# Patient Record
Sex: MALE | Race: WHITE | Employment: OTHER | URBAN - METROPOLITAN AREA
[De-identification: names, ages, dates, MRNs, and addresses within clinical notes are randomized per-mention and may not be internally consistent; named-entity substitution may affect disease eponyms.]

---

## 2024-01-12 ENCOUNTER — HOSPITAL ENCOUNTER (OUTPATIENT)
Dept: PHYSICAL THERAPY | Age: 84
Setting detail: THERAPIES SERIES
Discharge: HOME | End: 2024-01-12

## 2024-01-12 PROCEDURE — 97161 PT EVAL LOW COMPLEX 20 MIN: CPT | Performed by: PHYSICAL THERAPIST

## 2024-01-12 ASSESSMENT — PAIN SCALES - GENERAL: PAINLEVEL_OUTOF10: 4

## 2024-01-12 ASSESSMENT — PAIN DESCRIPTION - PAIN TYPE: TYPE: CHRONIC PAIN

## 2024-01-12 ASSESSMENT — PAIN DESCRIPTION - LOCATION: LOCATION: NECK

## 2024-01-12 ASSESSMENT — PAIN DESCRIPTION - DESCRIPTORS: DESCRIPTORS: TIGHTNESS

## 2024-01-12 NOTE — PROGRESS NOTES
Systems:  Follows Commands: Within Functional Limits  Integumentary Assessment  Edema: No    VBI Screening / Lumbar Screening:         Regional Screen:         Observations:        Palpation:   Cervical Spine Palpation: tightness noted across B cervical paraspinals/upper traps  Right Shoulder Palpation: no pain with palpation  Left Shoulder Palpation: no pain with palpation    Ambulation/Gait (if applicable):   Ambulation  Surface: Level tile  Device: No Device  Assistance: Independent    Balance Screen:   Balance  Posture: Fair (fwd head posturing, rounded shoulders)  Sitting - Static: Good  Sitting - Dynamic: Good  Standing - Static: Good  Standing - Dynamic: Good    Neuro Screen:   Sensation      Sensation  Overall Sensation Status: WFL     Left AROM  Right AROM                    Left PROM  Right PROM                    Left Strength  Right Strength         Strength LUE  L Shoulder Flexion: 4+/5  L Shoulder ABduction: 4+/5  L Shoulder Internal Rotation: 4+/5  L Shoulder External Rotation: 4+/5  L Elbow Flexion: 4+/5  L Elbow Extension: 4+/5    Strength RUE  R Shoulder Flexion: 4+/5  R Shoulder ABduction: 4+/5  R Shoulder Internal Rotation: 4+/5  R Shoulder External Rotation: 4+/5  R Elbow Flexion: 4+/5  R Elbow Extension: 4+/5     Cervical Assessment     AROM Cervical Spine   Cervical spine general AROM: limited 50% all planes  Cervical/upper back strength- grossly 4-/5              ASSESSMENT     Impression: Assessment: Patient presents to PT wtih c/o cerivcal pain/tightness. Pt did have bout of R shoulder pain howeve symptoms subsided with recent injection. AROM of cervical region remains limited 50% all planes. Limited strength noted across cervical/upper back with forward head posturing. AROM/strength of B shoulder is WFL withno onset of pain throughout session.    Body Structures, Functions, Activity Limitations Requiring Skilled Therapeutic Intervention: Decreased ROM, Decreased strength, Decreased

## 2024-01-17 ENCOUNTER — HOSPITAL ENCOUNTER (OUTPATIENT)
Dept: PHYSICAL THERAPY | Age: 84
Setting detail: THERAPIES SERIES
Discharge: HOME | End: 2024-01-17
Payer: COMMERCIAL

## 2024-01-17 PROCEDURE — 97161 PT EVAL LOW COMPLEX 20 MIN: CPT | Performed by: PHYSICAL THERAPIST

## 2024-01-17 PROCEDURE — 97110 THERAPEUTIC EXERCISES: CPT | Performed by: PHYSICAL THERAPIST

## 2024-01-17 ASSESSMENT — PAIN DESCRIPTION - ORIENTATION: ORIENTATION: LOWER

## 2024-01-17 ASSESSMENT — PAIN SCALES - GENERAL: PAINLEVEL_OUTOF10: 6

## 2024-01-17 ASSESSMENT — PAIN DESCRIPTION - LOCATION: LOCATION: BACK

## 2024-01-17 ASSESSMENT — PAIN DESCRIPTION - PAIN TYPE: TYPE: CHRONIC PAIN

## 2024-01-17 ASSESSMENT — PAIN DESCRIPTION - DESCRIPTORS: DESCRIPTORS: SPASM;ACHING

## 2024-01-17 NOTE — PROGRESS NOTES
Physical Therapy    Physical Therapy: Initial Evaluation    Patient: Laron Foster (83 y.o. male)   Examination Date: 2024  Plan of Care Certification Period: 2024 to        :  1940 ;    Confirmed: Yes MRN: [id number]  CSN: [id number]   Insurance: Payor: AETNA / Plan: AETNA / Product Type: *No Product type* /   Insurance ID: [id number] - (Commercial) Secondary Insurance (if applicable):    Referring Physician: Alex David MD     PCP: Vitaliy Fernandez MD Visits to Date/Visits Approved: 1 /      No Show/Cancelled Appts:   /       Medical Diagnosis: No admission diagnoses are documented for this encounter. low back pain  Treatment Diagnosis:       PERTINENT MEDICAL HISTORY           Medical History: Chart Reviewed: Yes   Past Medical History:   Diagnosis Date    Arthritis     Shoulders, hands (IP and MP joints)    CAD (coronary artery disease)     Carpal tunnel syndrome 2014    bilateral hands confirmed on EMG  - Dr. Ordonez    Dyslipidemia     Glaucoma     Hypertension     Inferior MI (HCC)     Lumbar disc disease     Right shoulder pain     Arthritis    Shoulder pain     bilateral     Surgical History:   Past Surgical History:   Procedure Laterality Date    APPENDECTOMY      BACK SURGERY      and 2013    BACK SURGERY      CARPAL TUNNEL RELEASE Right 10/22/2014    RJB    DIAGNOSTIC CARDIAC CATH LAB PROCEDURE  2010    Primary plasty MIMI to mid & ostial RCA by Dr. Lopez at [facility].    HEMORRHOID SURGERY      TOE SURGERY Right     REmoval of calcium deposits.    TONSILLECTOMY         Medications:   Current Outpatient Medications:     HYDROcodone-acetaminophen (NORCO) 5-325 MG per tablet, Take 1 tablet by mouth every 6 hours as needed for Pain., Disp: , Rfl:     naproxen sodium (ANAPROX) 550 MG tablet, Take  by mouth as needed., Disp: , Rfl:     aspirin 325 MG tablet, Take 81 mg by mouth daily., Disp: , Rfl:     Timolol Maleate (TIMOPTIC OP), 1 drop daily. Both eyes,

## 2024-01-17 NOTE — PROGRESS NOTES
[facility]  Phone: [phone] Fax: [phone]       Physical Therapy Daily Treatment Note  Date:  2024    Patient Name:  Laron Foster    :  1940  MRN: [id number]    Patient: Laron Foster (83 y.o. male)   Examination Date: 2024  Plan of Care Certification Period: 2024 to 24   :  1940 ;    Confirmed: Yes MRN: [id number]  CSN: [id number]   Insurance: Payor: /   Insurance ID: [id number] - (Commercial) Secondary Insurance (if applicable):    Referring Physician: Sofía Monahan APRN - NP     PCP: Vitaliy Fernandez MD Visits to Date/Visits Approved: 1 /      No Show/Cancelled Appts:   /       Medical Diagnosis: No admission diagnoses are documented for this encounter. bursitis right shoulder; cervical spondylolysis  Treatment Diagnosis:       Time In:   1045     Time Out:      1125    Subjective:  pt presents to PT for initial treatment session. Reports shoulder is feeling well however cont to have cervical symptoms     Exercises:  Exercise/Equipment Resistance/Repetitions Other comments   UBE   X6mins 3f/3b           Pulley flex              abd   X2mins  X2mins            Cervical SB    4x20s ea    Upper thoracic str  4x20s    Doorway str   4x20s            Shrugs    x20    Scap retractions      x20                                                                      Other Therapeutic Activities:      Home Exercise Program:      Manual Treatments:      Modalities:      Timed Code Treatment Minutes:      Total Treatment Minutes:      Treatment/Activity Tolerance:  [x] Patient tolerated treatment well [] Patient limited by fatigue  [] Patient limited by pain  [] Patient limited by other medical complications  [] Other: Performed there ex to increase cervical ROM/strength to reduce symptoms. Limited cervical motions remain. AROM of B shoulders is WFL. Generalized fatigue reported with exercises.     Plan:   [] Continue per plan of care [] Alter

## 2024-01-23 ENCOUNTER — HOSPITAL ENCOUNTER (OUTPATIENT)
Dept: PHYSICAL THERAPY | Age: 84
Setting detail: THERAPIES SERIES
Discharge: HOME | End: 2024-01-23
Payer: COMMERCIAL

## 2024-01-25 ENCOUNTER — HOSPITAL ENCOUNTER (OUTPATIENT)
Dept: PHYSICAL THERAPY | Age: 84
Setting detail: THERAPIES SERIES
Discharge: HOME | End: 2024-01-25
Payer: COMMERCIAL

## 2024-01-25 PROCEDURE — 97110 THERAPEUTIC EXERCISES: CPT | Performed by: PHYSICAL THERAPIST

## 2024-01-25 NOTE — PROGRESS NOTES
[facility]  Phone: [phone] Fax: [phone]       Physical Therapy Daily Treatment Note  Date:  2024    Patient Name:  Laron Foster    :  1940  MRN: [id number]    Patient: Laron Foster (83 y.o. male)   Examination Date: 2024  Plan of Care Certification Period: 2024 to 24   :  1940 ;    Confirmed: Yes MRN: [id number]  CSN: [id number]   Insurance: Payor: /   Insurance ID: [id number] - (Commercial) Secondary Insurance (if applicable):    Referring Physician: Sofía Monahan APRN - NP     PCP: Vitaliy Fernandez MD Visits to Date/Visits Approved: 4/      No Show/Cancelled Appts:   /       Medical Diagnosis: No admission diagnoses are documented for this encounter. bursitis right shoulder; cervical spondylolysis  Treatment Diagnosis:       Time In:   09  Time Out:      1000    Subjective:  pt reports that shoulders/neck are feeling better.     Exercises:  Exercise/Equipment Resistance/Repetitions Other comments   UBE   X6mins 3f/3b           Pulley flex              abd   X2mins  X2mins            Cervical SB    4x20s ea    Upper thoracic str  4x20s    Doorway str   4x20s            Shrugs    x20    Scap retractions      x20            Wand bilateral shoulder flexion/abd   2x10                                                       Other Therapeutic Activities:      Home Exercise Program:      Manual Treatments:      Modalities:  MH [id number]- back/neck region     Timed Code Treatment Minutes:      Total Treatment Minutes:      Treatment/Activity Tolerance:  [x] Patient tolerated treatment well [] Patient limited by fatigue  [] Patient limited by pain  [] Patient limited by other medical complications  [] Other: Performed there ex to increase cervical ROM/strength to reduce symptoms. Improved cervical motions noted with reduced pain.  AROM of B shoulders is WFL.  Added shoulder abd exercises with good tolerance. Patient does report discomfort with

## 2024-01-30 ENCOUNTER — HOSPITAL ENCOUNTER (OUTPATIENT)
Dept: PHYSICAL THERAPY | Age: 84
Setting detail: THERAPIES SERIES
Discharge: HOME | End: 2024-01-30
Payer: COMMERCIAL

## 2024-01-30 PROCEDURE — 97140 MANUAL THERAPY 1/> REGIONS: CPT | Performed by: PHYSICAL THERAPIST

## 2024-01-30 PROCEDURE — 97110 THERAPEUTIC EXERCISES: CPT | Performed by: PHYSICAL THERAPIST

## 2024-01-30 NOTE — PROGRESS NOTES
[facility]  Phone: [phone] Fax: [phone]       Physical Therapy Daily Treatment Note  Date:  2024    Patient Name:  Laron Foster    :  1940  MRN: [id number]    Patient: Laron Foster (83 y.o. male)   Examination Date: 2024  Plan of Care Certification Period: 2024 to 24   :  1940 ;    Confirmed: Yes MRN: [id number]  CSN: [id number]   Insurance: Payor: /   Insurance ID: [id number] - (Commercial) Secondary Insurance (if applicable):    Referring Physician: Sofía Monahan APRN - NP     PCP: Vitaliy Fernandez MD Visits to Date/Visits Approved: 5/      No Show/Cancelled Appts:   /       Medical Diagnosis: No admission diagnoses are documented for this encounter. bursitis right shoulder; cervical spondylolysis  Treatment Diagnosis:       Time In:   925  Time Out:      1010    Subjective:  pt reports that shoulders/neck are feeling better.     Exercises:  Exercise/Equipment Resistance/Repetitions Other comments   UBE   X6mins 3f/3b           Pulley flex              abd   X2mins  X2mins            Cervical SB    4x20s ea    Upper thoracic str  4x20s    Doorway str   4x20s            Shrugs    x20    Scap retractions      x20            Wand bilateral shoulder flexion/abd   2x10                                                       Other Therapeutic Activities:      Home Exercise Program:      Manual Treatments:  cervical traction/STM x 10mins     Modalities:    Timed Code Treatment Minutes:      Total Treatment Minutes:      Treatment/Activity Tolerance:  [x] Patient tolerated treatment well [] Patient limited by fatigue  [] Patient limited by pain  [] Patient limited by other medical complications  [] Other: Performed there ex to increase cervical ROM/strength to reduce symptoms. Improved cervical motions noted with reduced pain. Pt reports increased cervical motions with driving.  AROM of B shoulders is WFL.  Added cervical traction/STM for

## 2024-02-01 ENCOUNTER — HOSPITAL ENCOUNTER (OUTPATIENT)
Dept: PHYSICAL THERAPY | Age: 84
Setting detail: THERAPIES SERIES
Discharge: HOME | End: 2024-02-01
Payer: COMMERCIAL

## 2024-02-01 PROCEDURE — 97110 THERAPEUTIC EXERCISES: CPT | Performed by: PHYSICAL THERAPIST

## 2024-02-01 PROCEDURE — G0283 ELEC STIM OTHER THAN WOUND: HCPCS | Performed by: PHYSICAL THERAPIST

## 2024-02-01 NOTE — PROGRESS NOTES
[facility]  Phone: [phone] Fax: [phone]       Physical Therapy Daily Treatment Note  Date:  2024    Patient Name:  Laron Foster    :  1940  MRN: [id number]    Patient: Laron Foster (83 y.o. male)   Examination Date: 2024  Plan of Care Certification Period: 2024 to 24   :  1940 ;    Confirmed: Yes MRN: [id number]  CSN: [id number]   Insurance: Payor: /   Insurance ID: [id number] - (Commercial) Secondary Insurance (if applicable):    Referring Physician: Sofía Monahan APRN - NP     PCP: Vitaliy Fernandez MD Visits to Date/Visits Approved: 6/      No Show/Cancelled Appts:   /       Medical Diagnosis: No admission diagnoses are documented for this encounter. bursitis right shoulder; cervical spondylolysis  Treatment Diagnosis:       Time In:   920  Time Out:      1010    Subjective:  pt reports having some soreness yesterday to L cervical region    Exercises:  Exercise/Equipment Resistance/Repetitions Other comments   UBE             Pulley flex              abd   X2mins  X2mins            Cervical SB    4x20s ea    Upper thoracic str  4x20s    Doorway str   4x20s            Shrugs    x20    Scap retractions      x20            Wand bilateral shoulder flexion/abd   2x10                                                       Other Therapeutic Activities:      Home Exercise Program:      Manual Treatments:    Modalities:  MH/IFC x 20mins cervical region     Timed Code Treatment Minutes:      Total Treatment Minutes:      Treatment/Activity Tolerance:  [x] Patient tolerated treatment well [] Patient limited by fatigue  [] Patient limited by pain  [] Patient limited by other medical complications  [] Other: Performed there ex to increase cervical ROM/strength to reduce symptoms. Improved cervical motions noted with mild pain reported.  AROM of B shoulders is WFL.  Placed modalities on per pt request with reduction in symptoms. Pt to attend

## 2024-02-06 ENCOUNTER — HOSPITAL ENCOUNTER (OUTPATIENT)
Dept: PHYSICAL THERAPY | Age: 84
Setting detail: THERAPIES SERIES
Discharge: HOME | End: 2024-02-06
Payer: COMMERCIAL

## 2024-02-06 PROCEDURE — 97110 THERAPEUTIC EXERCISES: CPT | Performed by: PHYSICAL THERAPIST

## 2024-02-06 PROCEDURE — G0283 ELEC STIM OTHER THAN WOUND: HCPCS | Performed by: PHYSICAL THERAPIST

## 2024-02-06 NOTE — PROGRESS NOTES
[facility]  Phone: [phone] Fax: [phone]       Physical Therapy Daily Treatment Note  Date:  2024    Patient Name:  Laron Foster    :  1940  MRN: [id number]    Patient: Laron Foster (83 y.o. male)   Examination Date: 2024  Plan of Care Certification Period: 2024 to 24   :  1940 ;    Confirmed: Yes MRN: [id number]  CSN: [id number]   Insurance: Payor: /   Insurance ID: [id number] - (Commercial) Secondary Insurance (if applicable):    Referring Physician: Sofía Monahan APRN - NP     PCP: Vitaliy Fernandez MD Visits to Date/Visits Approved: /      No Show/Cancelled Appts:   /       Medical Diagnosis: No admission diagnoses are documented for this encounter. bursitis right shoulder; cervical spondylolysis  Treatment Diagnosis:       Time In:   930  Time Out:      1010    Subjective:  pt reports no significant shoulder/cervical pain. States irritating hamstring the other day    Exercises:  Exercise/Equipment Resistance/Repetitions Other comments   UBE   X6mins 3f/3b                     Cervical SB    4x20s ea    Upper thoracic str  4x20s    Doorway str   4x20s            Shrugs    x20    Scap retractions      x20            Wand bilateral shoulder flexion/abd   2x10                                                       Other Therapeutic Activities:      Home Exercise Program:      Manual Treatments:    Modalities:  MH/IFC x 20mins cervical region     Timed Code Treatment Minutes:      Total Treatment Minutes:      Treatment/Activity Tolerance:  [x] Patient tolerated treatment well [] Patient limited by fatigue  [] Patient limited by pain  [] Patient limited by other medical complications  [] Other: Performed there ex to increase cervical ROM/strength to reduce symptoms. Improved cervical motions noted with no significant pain with there ex. AROM of B shoulders is WFL.  Placed modalities on per pt request with reduction in symptoms. Pt to

## 2024-02-08 ENCOUNTER — HOSPITAL ENCOUNTER (OUTPATIENT)
Dept: PHYSICAL THERAPY | Age: 84
Setting detail: THERAPIES SERIES
Discharge: HOME | End: 2024-02-08
Payer: COMMERCIAL

## 2024-02-08 PROCEDURE — G0283 ELEC STIM OTHER THAN WOUND: HCPCS | Performed by: PHYSICAL THERAPIST

## 2024-02-08 PROCEDURE — 97110 THERAPEUTIC EXERCISES: CPT | Performed by: PHYSICAL THERAPIST

## 2024-02-08 NOTE — PROGRESS NOTES
[facility]  Phone: [phone] Fax: [phone]       Physical Therapy Daily Treatment Note  Date:  2024    Patient Name:  Laron Foster    :  1940  MRN: [id number]    Patient: Laron Foster (83 y.o. male)   Examination Date: 2024  Plan of Care Certification Period: 2024 to 24   :  1940 ;    Confirmed: Yes MRN: [id number]  CSN: [id number]   Insurance: Payor: /   Insurance ID: [id number] - (Commercial) Secondary Insurance (if applicable):    Referring Physician: Sofía Monahan APRN - NP     PCP: Vitaliy Fernandez MD Visits to Date/Visits Approved: 7/      No Show/Cancelled Appts:   /       Medical Diagnosis: No admission diagnoses are documented for this encounter. bursitis right shoulder; cervical spondylolysis  Treatment Diagnosis:       Time In:   930  Time Out:      1010    Subjective:  pt reports no significant shoulder/cervical pain.admits to no radicular symptoms     Exercises:  Exercise/Equipment Resistance/Repetitions Other comments   UBE   X6mins 3f/3b           Pulley flex              abd   X2mins  X2mins            Cervical SB    4x20s ea    Upper thoracic str  4x20s    Doorway str   4x20s            Shrugs    x20    Scap retractions      x20            Wand bilateral shoulder flexion/abd   2x10                                                       Other Therapeutic Activities:      Home Exercise Program:      Manual Treatments:    Modalities:  MH/IFC x 20mins cervical region     Timed Code Treatment Minutes:      Total Treatment Minutes:      Treatment/Activity Tolerance:  [x] Patient tolerated treatment well [] Patient limited by fatigue  [] Patient limited by pain  [] Patient limited by other medical complications  [] Other: Performed there ex to increase cervical ROM/strength to reduce symptoms. Mild tightness across cervical region at end ranges however overall WFL.  AROM of B shoulders is WFL.  Placed modalities on per pt request

## 2024-02-12 ENCOUNTER — OFFICE VISIT (OUTPATIENT)
Dept: ORTHOPEDIC SURGERY | Facility: CLINIC | Age: 84
End: 2024-02-12
Payer: MEDICARE

## 2024-02-12 DIAGNOSIS — M54.50 LOW BACK PAIN, UNSPECIFIED BACK PAIN LATERALITY, UNSPECIFIED CHRONICITY, UNSPECIFIED WHETHER SCIATICA PRESENT: Primary | ICD-10-CM

## 2024-02-12 PROCEDURE — 99204 OFFICE O/P NEW MOD 45 MIN: CPT | Performed by: ORTHOPAEDIC SURGERY

## 2024-02-13 ENCOUNTER — HOSPITAL ENCOUNTER (OUTPATIENT)
Dept: PHYSICAL THERAPY | Age: 84
Setting detail: THERAPIES SERIES
Discharge: HOME | End: 2024-02-13
Payer: COMMERCIAL

## 2024-02-13 PROCEDURE — 97110 THERAPEUTIC EXERCISES: CPT | Performed by: PHYSICAL THERAPIST

## 2024-02-13 PROCEDURE — G0283 ELEC STIM OTHER THAN WOUND: HCPCS | Performed by: PHYSICAL THERAPIST

## 2024-02-13 NOTE — PROGRESS NOTES
[facility]  Phone: [phone] Fax: [phone]       Physical Therapy Daily Treatment Note  Date:  2024    Patient Name:  Laron Foster    :  1940  MRN: [id number]    Patient: Laron Foster (83 y.o. male)   Examination Date: 2024  Plan of Care Certification Period: 2024 to 24   :  1940 ;    Confirmed: Yes MRN: [id number]  CSN: [id number]   Insurance: Payor: /   Insurance ID: [id number] - (Commercial) Secondary Insurance (if applicable):    Referring Physician: Sofía Monahan APRN - NP     PCP: Vitaliy Fernandez MD Visits to Date/Visits Approved: 8/      No Show/Cancelled Appts:   /       Medical Diagnosis: No admission diagnoses are documented for this encounter. bursitis right shoulder; cervical spondylolysis  Treatment Diagnosis:       Time In:   930  Time Out:      1020    Subjective:  pt reports mild neck tightness otherwise no issues. Did see surgeon the other day regarding back pain. Surgeon is not recommending surgery at this time.     Exercises:  Exercise/Equipment Resistance/Repetitions Other comments   UBE   X6mins 3f/3b           Pulley flex              abd   X2mins  X2mins            Cervical SB    4x20s ea    Upper thoracic str  4x20s    Doorway str   4x20s            Shrugs    x20    Scap retractions      x20            Wand bilateral shoulder flexion/abd   2x10                                                       Other Therapeutic Activities:      Home Exercise Program:      Manual Treatments:    Modalities:  MH/IFC x 20mins cervical region     Timed Code Treatment Minutes:      Total Treatment Minutes:      Treatment/Activity Tolerance:  [x] Patient tolerated treatment well [] Patient limited by fatigue  [] Patient limited by pain  [] Patient limited by other medical complications  [] Other: Performed there ex to increase cervical ROM/strength to reduce symptoms. Pt reports end range tightness however overall cervical ROM is

## 2024-02-13 NOTE — PROGRESS NOTES
This very pleasant 83-year-old man is self-referred.  He has had 2 prior back surgeries, 1 in 1990 with Dr. Pedro and a spine fusion in 2012 by Dr. Carvajal.  He did well with those surgeries.    Currently he describes back pain and stiffness.  He has difficulty walking for distances because of back pain.    He is not experiencing any severe radiating leg pain.  He does not describe neurogenic claudication.  He does have chronic left foot numbness and occasional numbness in his right foot.    Of note, he did see Dr. Scotty Ybarra who recommended a nonsurgical approach.    He has been doing some ongoing physical therapy.    He does have remote coronary artery disease.    He is here today with his wife and daughter.    Family, social, and medical histories are obtained and reviewed.    30-point, patient-recorded Review of Systems is personally obtained and reviewed. Inclusive is no history of weight loss, change in appetite, recent change in activity level, change in bowel or bladder habits, fevers, chills, malaise, or night pain.    On exam pleasant older man who appears in very good condition.  He has a slow deliberate gait.  Mild kyphosis.  Painless motion both hips.  Strength is intact in the lower extremities.    We reviewed multiple studies.  He has had a posterior fusion L3-5 with a TLIF at L3-4.  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    An MRI from April 2023 shows severe stenosis at L1-2 and moderately severe stenosis at L2-3.    Impression: This healthy 83-year-old man has primarily mechanical type back pain.  He does have significant adjacent level stenosis above his prior fusion.  The degree of stenosis that he has would warrant discussion of surgery.  However he is functioning pretty well at this point and he really does not have severe neurologic symptoms, but rather primarily back pain.  As such I would recommend a nonsurgical approach.  Further surgery would be a big  undertaking, likely with a lateral approach at L1-2 and L2-3 followed by a revision posterior decompression and instrumented fusion.  That would be a major undertaking and at this point he is functioning pretty well so I think he should hold tight.  Certainly if his symptoms recur in any severity, I would be happy to see him back.    ** Dictated with voice recognition software and not immediately reviewed for errors in grammar and/or spelling **

## 2024-02-15 ENCOUNTER — HOSPITAL ENCOUNTER (OUTPATIENT)
Dept: PHYSICAL THERAPY | Age: 84
Setting detail: THERAPIES SERIES
Discharge: HOME | End: 2024-02-15
Payer: COMMERCIAL

## 2024-02-15 PROCEDURE — 97110 THERAPEUTIC EXERCISES: CPT | Performed by: PHYSICAL THERAPIST

## 2024-02-15 PROCEDURE — G0283 ELEC STIM OTHER THAN WOUND: HCPCS | Performed by: PHYSICAL THERAPIST

## 2024-02-15 NOTE — PROGRESS NOTES
[facility]  Phone: [phone] Fax: [phone]       Physical Therapy Daily Treatment Note  Date:  2/15/2024    Patient Name:  Laron Foster    :  1940  MRN: [id number]    Patient: Laron Foster (83 y.o. male)   Examination Date: 2024  Plan of Care Certification Period: 2024 to 24   :  1940 ;    Confirmed: Yes MRN: [id number]  CSN: [id number]   Insurance: Payor: /   Insurance ID: [id number] - (Commercial) Secondary Insurance (if applicable):    Referring Physician: Sofía Monahan APRN - NP     PCP: Vitaliy Fernandez MD Visits to Date/Visits Approved: /      No Show/Cancelled Appts:   /       Medical Diagnosis: No admission diagnoses are documented for this encounter. bursitis right shoulder; cervical spondylolysis  Treatment Diagnosis:       Time In:   930  Time Out:      1020    Subjective:  pt reports overall neck is doing good. Wants to begin low back therapy on land with new script.     Exercises:  Exercise/Equipment Resistance/Repetitions Other comments   UBE   X6mins 3f/3b           Pulley flex              abd   X2mins  X2mins            Cervical SB    4x20s ea    Upper thoracic str  4x20s    Doorway str   4x20s            Shrugs    x20    Scap retractions      x20            Wand bilateral shoulder flexion/abd   2x10                                                       Other Therapeutic Activities:      Home Exercise Program:      Manual Treatments:    Modalities:  MH/IFC x 20mins cervical region     Timed Code Treatment Minutes:      Total Treatment Minutes:      Treatment/Activity Tolerance:  [x] Patient tolerated treatment well [] Patient limited by fatigue  [] Patient limited by pain  [] Patient limited by other medical complications  [] Other: Performed there ex to increase cervical ROM/strength to reduce symptoms. Pt reports end range tightness however overall cervical ROM is WFL.  AROM of B shoulders is WFL.  Placed modalities on per

## 2024-02-20 ENCOUNTER — HOSPITAL ENCOUNTER (OUTPATIENT)
Dept: PHYSICAL THERAPY | Age: 84
Setting detail: THERAPIES SERIES
Discharge: HOME | End: 2024-02-20
Payer: COMMERCIAL

## 2024-02-20 PROCEDURE — 97113 AQUATIC THERAPY/EXERCISES: CPT

## 2024-02-22 ENCOUNTER — HOSPITAL ENCOUNTER (OUTPATIENT)
Dept: PHYSICAL THERAPY | Age: 84
Setting detail: THERAPIES SERIES
Discharge: HOME | End: 2024-02-22
Payer: COMMERCIAL

## 2024-02-22 PROCEDURE — 97113 AQUATIC THERAPY/EXERCISES: CPT

## 2024-02-24 NOTE — PROGRESS NOTES
limited by pain [] Patient limited by other medical complications  [x] Other: transitions appear painful-sitting / squatting painful     Prognosis: [] Good [x] Fair  [] Poor    Patient Requires Follow-up: [x] Yes  [] No    Plan: Aquatic therapy to Increase functional mobility ;Increase ROM;Increase strength;Increase endurance;Improve posture;Decrease pain  [x] Continue per plan of care [] Alter current plan (see comments)  [] Plan of care initiated [] Hold pending MD visit [] Discharge    Treatment Charges: Mins Units   Initial Evaluation     Re-Evaluation     Ther Exercise         TE     Aquatic Treatment 60 4   Ther Activities        TA     Gait Training          GT     Neuro Re-education NR     Modalities     Non-Billable Service Time     Other     Total Time/Units 60 4           Electronically signed by:  Allison Wilson PTA [id number]

## 2024-02-28 ENCOUNTER — HOSPITAL ENCOUNTER (OUTPATIENT)
Dept: PHYSICAL THERAPY | Age: 84
Setting detail: THERAPIES SERIES
Discharge: HOME | End: 2024-02-28
Payer: COMMERCIAL

## 2024-02-28 PROCEDURE — 97161 PT EVAL LOW COMPLEX 20 MIN: CPT | Performed by: PHYSICAL THERAPIST

## 2024-02-28 ASSESSMENT — PAIN DESCRIPTION - ORIENTATION: ORIENTATION: LOWER

## 2024-02-28 ASSESSMENT — PAIN DESCRIPTION - DESCRIPTORS: DESCRIPTORS: ACHING;NAGGING

## 2024-02-28 ASSESSMENT — PAIN SCALES - GENERAL: PAINLEVEL_OUTOF10: 8

## 2024-02-28 ASSESSMENT — PAIN DESCRIPTION - LOCATION: LOCATION: BACK

## 2024-02-28 NOTE — PROGRESS NOTES
Sofía Monahan APRN - NP        Physician Comments: _______________________________________________    Please sign and return to [facility] PHYSICAL THERAPY.  Please fax to the location listed below. THANK YOU for this referral!    JOSE MIGUEL ALVES [facility] PHYSICAL THERAPY  [address] [place] [id number]  Dept: [phone]  Fax: [phone]       POC NOTE

## 2024-03-04 ENCOUNTER — HOSPITAL ENCOUNTER (OUTPATIENT)
Dept: PHYSICAL THERAPY | Age: 84
Setting detail: THERAPIES SERIES
Discharge: HOME | End: 2024-03-04
Payer: COMMERCIAL

## 2024-03-04 NOTE — PROGRESS NOTES
[facility]  Phone: [phone] Fax: [phone]       Physical Therapy Daily Treatment Note  Date:  3/4/2024    Patient Name:  Laron Foster    :  1940  MRN: [id number]    Patient: Laron Foster (83 y.o. male)   Examination Date: 2024  Plan of Care Certification Period: 2024 to     :  1940 ;    Confirmed: Yes MRN: [id number]  CSN: [id number]   Insurance: Payor: AETNA / Plan: AETNA / Product Type: *No Product type* /   Insurance ID: [id number] - (Commercial) Secondary Insurance (if applicable):    Referring Physician: Sofía Monahan APRN - NP     PCP: Vitaliy Fernandez MD Visits to Date/Visits Approved: 1 /      No Show/Cancelled Appts:   /       Medical Diagnosis: No admission diagnoses are documented for this encounter. low back pain  Treatment Diagnosis:       Time In:      1030  Time Out:    1140      Subjective:  pt reports having pain across R buttocks and calf region today     Exercises:  Exercise/Equipment Resistance/Repetitions Other comments   Bike    [id number]            Tball flex            rot   10x10s  10x10s ea           Rot str   10x10s ea    Piriformis str   5x10s ea           Bridge    x15    Pelvic tilts    x15    SLR      x10ea            Prone prop x 1 pillow   Prone prop x2 pillows    X2mins  x2mins           Standing ext    x10                                         Other Therapeutic Activities:      Home Exercise Program:      Manual Treatments:      Modalities:      Timed Code Treatment Minutes:      Total Treatment Minutes:      Treatment/Activity Tolerance:  [x] Patient tolerated treatment well [] Patient limited by fatigue  [] Patient limited by pain  [] Patient limited by other medical complications  [] Other: performed there ex to increase AROM/strength of trunk region. Good tolerance to all exercises with reduction in symptoms throughout. Performed prone exercises to increase trunk extension. Due to shoulder pathology, PT

## 2024-03-05 ENCOUNTER — HOSPITAL ENCOUNTER (OUTPATIENT)
Dept: PHYSICAL THERAPY | Age: 84
Setting detail: THERAPIES SERIES
Discharge: HOME | End: 2024-03-05
Payer: COMMERCIAL

## 2024-03-06 ENCOUNTER — HOSPITAL ENCOUNTER (OUTPATIENT)
Dept: PHYSICAL THERAPY | Age: 84
Setting detail: THERAPIES SERIES
Discharge: HOME | End: 2024-03-06
Payer: COMMERCIAL

## 2024-03-06 PROCEDURE — G0283 ELEC STIM OTHER THAN WOUND: HCPCS | Performed by: PHYSICAL THERAPIST

## 2024-03-06 PROCEDURE — 97110 THERAPEUTIC EXERCISES: CPT | Performed by: PHYSICAL THERAPIST

## 2024-03-06 NOTE — PROGRESS NOTES
[facility]  Phone: [phone] Fax: [phone]       Physical Therapy Daily Treatment Note  Date:  3/6/2024    Patient Name:  Laron Foster    :  1940  MRN: [id number]    Patient: Laron Foster (83 y.o. male)   Examination Date: 2024  Plan of Care Certification Period: 2024 to     :  1940 ;    Confirmed: Yes MRN: [id number]  CSN: [id number]   Insurance: Payor: AETNA / Plan: AETNA / Product Type: *No Product type* /   Insurance ID: [id number] - (Commercial) Secondary Insurance (if applicable):    Referring Physician: Sofía Monahan APRN - NP     PCP: Vitaliy Fernandez MD Visits to Date/Visits Approved: 3 /      No Show/Cancelled Appts:   /       Medical Diagnosis: No admission diagnoses are documented for this encounter. low back pain  Treatment Diagnosis:       Time In:      1030  Time Out:    1140      Subjective:  pt reports cont to have pain across back region. Symptoms are intermittent depending upon activity     Exercises:  Exercise/Equipment Resistance/Repetitions Other comments   Bike    [id number]            Tball flex            rot   10x10s  10x10s ea           Rot str   10x10s ea    Piriformis str   5x10s ea           Bridge    x15    Pelvic tilts    x15    SLR      x10ea            Prone prop x 1 pillow   Prone prop x2 pillows    X2mins  x2mins           Standing ext    x10                                         Other Therapeutic Activities:      Home Exercise Program:      Manual Treatments:      Modalities:      Timed Code Treatment Minutes:      Total Treatment Minutes:      Treatment/Activity Tolerance:  [x] Patient tolerated treatment well [] Patient limited by fatigue  [] Patient limited by pain  [] Patient limited by other medical complications  [] Other: performed there ex to increase AROM/strength of trunk region. Good tolerance to all exercises with reduction in symptoms throughout. Cont  prone exercises to increase trunk extension.

## 2024-03-11 ENCOUNTER — HOSPITAL ENCOUNTER (OUTPATIENT)
Dept: PHYSICAL THERAPY | Age: 84
Setting detail: THERAPIES SERIES
Discharge: HOME | End: 2024-03-11
Payer: COMMERCIAL

## 2024-03-11 PROCEDURE — 97110 THERAPEUTIC EXERCISES: CPT | Performed by: PHYSICAL THERAPIST

## 2024-03-11 PROCEDURE — G0283 ELEC STIM OTHER THAN WOUND: HCPCS | Performed by: PHYSICAL THERAPIST

## 2024-03-11 NOTE — PROGRESS NOTES
placed pillows under chest to increase extension while limiting shoulder stress. Pt demonstrating increased independence with HEP. Modalities following for overall reduction in symptoms    Plan:   [] Continue per plan of care [] Alter current plan (see comments)  [] Plan of care initiated [] Hold pending MD visit [] Discharge  Plan for Next Session:         Treatment Charges: Mins Units   Initial Evaluation     Re-Evaluation     Ther Exercise         TE 40 2   Manual Therapy     MT     Ther Activities        TA     Gait Training          GT     Neuro Re-education NR     Modalities 20 1   Non-Billable Service Time 10 -   Other     Total Time/Units 70 3     Electronically signed by:  Jose Trimble, PT

## 2024-03-13 ENCOUNTER — HOSPITAL ENCOUNTER (OUTPATIENT)
Dept: PHYSICAL THERAPY | Age: 84
Setting detail: THERAPIES SERIES
Discharge: HOME | End: 2024-03-13
Payer: COMMERCIAL

## 2024-03-13 PROCEDURE — G0283 ELEC STIM OTHER THAN WOUND: HCPCS | Performed by: PHYSICAL THERAPIST

## 2024-03-13 PROCEDURE — 97110 THERAPEUTIC EXERCISES: CPT | Performed by: PHYSICAL THERAPIST

## 2024-03-13 NOTE — PROGRESS NOTES
[facility]  Phone: [phone] Fax: [phone]       Physical Therapy Daily Treatment Note  Date:  3/13/2024    Patient Name:  Laron Foster    :  1940  MRN: [id number]    Patient: Laron Foster (83 y.o. male)   Examination Date: 2024  Plan of Care Certification Period: 2024 to     :  1940 ;    Confirmed: Yes MRN: [id number]  CSN: [id number]   Insurance: Payor: AETNA / Plan: AETNA / Product Type: *No Product type* /   Insurance ID: [id number] - (Commercial) Secondary Insurance (if applicable):    Referring Physician: Sofía Monahan APRN - NP     PCP: Vitaliy Fernandez MD Visits to Date/Visits Approved: 5 /      No Show/Cancelled Appts:   /       Medical Diagnosis: No admission diagnoses are documented for this encounter. low back pain  Treatment Diagnosis:       Time In:      1030  Time Out:    1140      Subjective:  pt reports having increased back pain and LE numbness.     Exercises:  Exercise/Equipment Resistance/Repetitions Other comments   Bike    [id number]            Tball flex            rot   10x10s  10x10s ea           Rot str   10x10s ea    Piriformis str   5x10s ea           Bridge    x15    Pelvic tilts    x15    SLR      x10ea            Prone prop x 1 pillow   Prone prop x2 pillows    X2mins  x2mins           Standing ext    x10            Sit to stand   x10                           Other Therapeutic Activities:      Home Exercise Program:      Manual Treatments:      Modalities:      Timed Code Treatment Minutes:      Total Treatment Minutes:      Treatment/Activity Tolerance:  [x] Patient tolerated treatment well [] Patient limited by fatigue  [] Patient limited by pain  [] Patient limited by other medical complications  [] Other: performed there ex to increase AROM/strength of trunk region. Good tolerance to all exercises with reduction in symptoms throughout. Cont  prone exercises to increase trunk extension. Due to shoulder pathology,

## 2024-03-20 ENCOUNTER — HOSPITAL ENCOUNTER (OUTPATIENT)
Dept: PHYSICAL THERAPY | Age: 84
Setting detail: THERAPIES SERIES
Discharge: HOME | End: 2024-03-20
Payer: COMMERCIAL

## 2024-03-20 PROCEDURE — 97110 THERAPEUTIC EXERCISES: CPT | Performed by: PHYSICAL THERAPIST

## 2024-03-20 PROCEDURE — G0283 ELEC STIM OTHER THAN WOUND: HCPCS | Performed by: PHYSICAL THERAPIST

## 2024-03-20 NOTE — PROGRESS NOTES
[facility]  Phone: [phone] Fax: [phone]       Physical Therapy Daily Treatment Note  Date:  3/20/2024    Patient Name:  Laron Foster    :  1940  MRN: [id number]    Patient: Laron Foster (83 y.o. male)   Examination Date: 2024  Plan of Care Certification Period: 2024 to     :  1940 ;    Confirmed: Yes MRN: [id number]  CSN: [id number]   Insurance: Payor: AETNA / Plan: AETNA / Product Type: *No Product type* /   Insurance ID: [id number] - (Commercial) Secondary Insurance (if applicable):    Referring Physician: Sofía Monahan APRN - NP     PCP: Vitaliy Fernandez MD Visits to Date/Visits Approved: 6 /      No Show/Cancelled Appts:   /       Medical Diagnosis: No admission diagnoses are documented for this encounter. low back pain  Treatment Diagnosis:       Time In:      1030  Time Out:    1140      Subjective:  pt reports back pain cont to increase with prolonged sitting/driving     Exercises:  Exercise/Equipment Resistance/Repetitions Other comments   Bike    [id number]            Tball flex            rot   10x10s  10x10s ea           Rot str   10x10s ea    Piriformis str   5x10s ea           Bridge    x15    Pelvic tilts    x15    SLR      x10ea            Prone prop x 1 pillow   Prone prop x2 pillows    X2mins  x2mins           Standing ext    x10            Sit to stand   x10                           Other Therapeutic Activities:      Home Exercise Program:      Manual Treatments:      Modalities:      Timed Code Treatment Minutes:      Total Treatment Minutes:      Treatment/Activity Tolerance:  [x] Patient tolerated treatment well [] Patient limited by fatigue  [] Patient limited by pain  [] Patient limited by other medical complications  [] Other: performed there ex to increase AROM/strength of trunk region. Good tolerance to all exercises with reduction in symptoms throughout. Cont  prone exercises to increase trunk extension. Due to

## 2024-03-25 ENCOUNTER — HOSPITAL ENCOUNTER (OUTPATIENT)
Dept: PHYSICAL THERAPY | Age: 84
Setting detail: THERAPIES SERIES
Discharge: HOME | End: 2024-03-25
Payer: COMMERCIAL

## 2024-03-25 PROCEDURE — G0283 ELEC STIM OTHER THAN WOUND: HCPCS | Performed by: PHYSICAL THERAPIST

## 2024-03-25 PROCEDURE — 97110 THERAPEUTIC EXERCISES: CPT | Performed by: PHYSICAL THERAPIST

## 2024-03-25 NOTE — PROGRESS NOTES
[facility]  Phone: [phone] Fax: [phone]       Physical Therapy Daily Treatment Note  Date:  3/25/2024    Patient Name:  Laron Foster    :  1940  MRN: [id number]    Patient: Laron Foster (83 y.o. male)   Examination Date: 2024  Plan of Care Certification Period: 2024 to     :  1940 ;    Confirmed: Yes MRN: [id number]  CSN: [id number]   Insurance: Payor: AETNA / Plan: AETNA / Product Type: *No Product type* /   Insurance ID: [id number] - (Commercial) Secondary Insurance (if applicable):    Referring Physician: Sofía Monahan APRN - NP     PCP: Vitaliy Fernandez MD Visits to Date/Visits Approved: 7 /      No Show/Cancelled Appts:   /       Medical Diagnosis: No admission diagnoses are documented for this encounter. low back pain  Treatment Diagnosis:       Time In:      1030  Time Out:    1140      Subjective:  pt reports cont to have back pain that radiates into buttocks/hip region    Exercises:  Exercise/Equipment Resistance/Repetitions Other comments   Bike    [id number]            Tball flex            rot   10x10s  10x10s ea           Rot str   10x10s ea    Piriformis str   5x10s ea           Bridge    x15    Pelvic tilts    x15    SLR      x10ea            Prone prop x 1 pillow   Prone prop x2 pillows    X2mins  x2mins           Standing ext    x10            Sit to stand   x10                           Other Therapeutic Activities:      Home Exercise Program:      Manual Treatments:      Modalities:      Timed Code Treatment Minutes:      Total Treatment Minutes:      Treatment/Activity Tolerance:  [x] Patient tolerated treatment well [] Patient limited by fatigue  [] Patient limited by pain  [] Patient limited by other medical complications  [] Other: performed there ex to increase AROM/strength of trunk region. Good tolerance to all exercises with reduction in symptoms throughout. Cont  prone exercises to increase trunk extension. Due to

## 2024-03-27 ENCOUNTER — HOSPITAL ENCOUNTER (OUTPATIENT)
Dept: PHYSICAL THERAPY | Age: 84
Setting detail: THERAPIES SERIES
Discharge: HOME | End: 2024-03-27
Payer: COMMERCIAL

## 2024-03-27 PROCEDURE — G0283 ELEC STIM OTHER THAN WOUND: HCPCS | Performed by: PHYSICAL THERAPIST

## 2024-03-27 PROCEDURE — 97110 THERAPEUTIC EXERCISES: CPT | Performed by: PHYSICAL THERAPIST

## 2024-03-27 NOTE — PROGRESS NOTES
[facility]  Phone: [phone] Fax: [phone]       Physical Therapy Daily Treatment Note  Date:  3/27/2024    Patient Name:  Laron Foster    :  1940  MRN: [id number]    Patient: Laron Foster (83 y.o. male)   Examination Date: 2024  Plan of Care Certification Period: 2024 to     :  1940 ;    Confirmed: Yes MRN: [id number]  CSN: [id number]   Insurance: Payor: AETNA / Plan: AETNA / Product Type: *No Product type* /   Insurance ID: [id number] - (Commercial) Secondary Insurance (if applicable):    Referring Physician: Sofía Monahan APRN - NP     PCP: Vitaliy Fernandez MD Visits to Date/Visits Approved: 8/      No Show/Cancelled Appts:   /       Medical Diagnosis: No admission diagnoses are documented for this encounter. low back pain  Treatment Diagnosis:       Time In:      1030  Time Out:    1140      Subjective:  pt reports cont to have pain in buttocks region. States placing his hand under helps alleviate it    Exercises:  Exercise/Equipment Resistance/Repetitions Other comments   Bike    [id number]            Tball flex            rot   10x10s  10x10s ea           Rot str   10x10s ea    Piriformis str   5x10s ea           Bridge    x15    Pelvic tilts    x15    SLR      x10ea            Prone prop x 1 pillow   Prone prop x2 pillows    X2mins  x2mins           Standing ext    x10            Sit to stand   x10                           Other Therapeutic Activities:      Home Exercise Program:      Manual Treatments:      Modalities:      Timed Code Treatment Minutes:      Total Treatment Minutes:      Treatment/Activity Tolerance:  [x] Patient tolerated treatment well [] Patient limited by fatigue  [] Patient limited by pain  [] Patient limited by other medical complications  [] Other: performed there ex to increase AROM/strength of trunk region. Good tolerance to all exercises with reduction in symptoms throughout. Cont  prone exercises to increase

## 2024-04-08 ENCOUNTER — HOSPITAL ENCOUNTER (OUTPATIENT)
Dept: PHYSICAL THERAPY | Age: 84
Setting detail: THERAPIES SERIES
Discharge: HOME | End: 2024-04-08
Payer: COMMERCIAL

## 2024-04-08 PROCEDURE — G0283 ELEC STIM OTHER THAN WOUND: HCPCS | Performed by: PHYSICAL THERAPIST

## 2024-04-08 PROCEDURE — 97110 THERAPEUTIC EXERCISES: CPT | Performed by: PHYSICAL THERAPIST

## 2024-04-08 NOTE — PROGRESS NOTES
[facility]  Phone: [phone] Fax: [phone]       Physical Therapy Daily Treatment Note  Date:  2024    Patient Name:  Laron Foster    :  1940  MRN: [id number]    Patient: Laron Foster (83 y.o. male)   Examination Date: 2024  Plan of Care Certification Period: 2024 to     :  1940 ;    Confirmed: Yes MRN: [id number]  CSN: [id number]   Insurance: Payor: AETNA / Plan: AETNA / Product Type: *No Product type* /   Insurance ID: [id number] - (Commercial) Secondary Insurance (if applicable):    Referring Physician: Sofía Monahan APRN - NP     PCP: Vitaliy Fernandez MD Visits to Date/Visits Approved:       No Show/Cancelled Appts:   /       Medical Diagnosis: No admission diagnoses are documented for this encounter. low back pain  Treatment Diagnosis:       Time In:      1030  Time Out:    1135     Subjective:  pt reports back/radicular symptoms have improved slightly     Exercises:  Exercise/Equipment Resistance/Repetitions Other comments   Bike    [id number]            Tball flex            rot   10x10s  10x10s ea           Rot str   10x10s ea    Piriformis str   5x10s ea           Bridge    x15    Pelvic tilts    x15    SLR      x10ea            Prone prop x 1 pillow   Prone prop x2 pillows    X2mins  x2mins           Standing ext    x10            Sit to stand   x10                           Other Therapeutic Activities:      Home Exercise Program:      Manual Treatments:      Modalities:      Timed Code Treatment Minutes:      Total Treatment Minutes:      Treatment/Activity Tolerance:  [x] Patient tolerated treatment well [] Patient limited by fatigue  [] Patient limited by pain  [] Patient limited by other medical complications  [] Other: performed there ex to increase AROM/strength of trunk region. Good tolerance to all exercises with reduction in symptoms throughout. Cont  prone exercises to increase trunk extension. Pt demonstrates

## 2024-04-10 ENCOUNTER — HOSPITAL ENCOUNTER (OUTPATIENT)
Dept: PHYSICAL THERAPY | Age: 84
Setting detail: THERAPIES SERIES
Discharge: HOME | End: 2024-04-10
Payer: COMMERCIAL

## 2024-04-10 PROCEDURE — G0283 ELEC STIM OTHER THAN WOUND: HCPCS | Performed by: PHYSICAL THERAPIST

## 2024-04-10 PROCEDURE — 97110 THERAPEUTIC EXERCISES: CPT | Performed by: PHYSICAL THERAPIST

## 2024-04-10 NOTE — PROGRESS NOTES
[facility]  Phone: [phone] Fax: [phone]       Physical Therapy Daily Treatment Note  Date:  4/10/2024    Patient Name:  Laron Foster    :  1940  MRN: [id number]    Patient: Laron Foster (83 y.o. male)   Examination Date: 2024  Plan of Care Certification Period: 2024 to     :  1940 ;    Confirmed: Yes MRN: [id number]  CSN: [id number]   Insurance: Payor: AETNA / Plan: AETNA / Product Type: *No Product type* /   Insurance ID: [id number] - (Commercial) Secondary Insurance (if applicable):    Referring Physician: Sofía Monahan APRN - NP     PCP: Vitaliy Fernandez MD Visits to Date/Visits Approved: 10/      No Show/Cancelled Appts:   /       Medical Diagnosis: No admission diagnoses are documented for this encounter. low back pain  Treatment Diagnosis:       Time In:      1030  Time Out:    1130     Subjective:  pt reports cont to have tightness across back region     Exercises:  Exercise/Equipment Resistance/Repetitions Other comments   Bike    [id number]            Tball flex            rot   10x10s  10x10s ea           Rot str   10x10s ea    Piriformis str   5x10s ea           Bridge    x15    Pelvic tilts    x15    SLR      x10ea            Prone prop x 1 pillow   Prone prop x2 pillows    X2mins  x2mins           Standing ext    x10            Sit to stand   x10                           Other Therapeutic Activities:      Home Exercise Program:      Manual Treatments:      Modalities:      Timed Code Treatment Minutes:      Total Treatment Minutes:      Treatment/Activity Tolerance:  [x] Patient tolerated treatment well [] Patient limited by fatigue  [] Patient limited by pain  [] Patient limited by other medical complications  [] Other: performed there ex to increase AROM/strength of trunk region. Good tolerance to all exercises with reduction in symptoms throughout. Cont  prone exercises to increase trunk extension. Pt demonstrates independence

## 2024-10-15 ENCOUNTER — HOSPITAL ENCOUNTER (OUTPATIENT)
Dept: PHYSICAL THERAPY | Age: 84
Setting detail: THERAPIES SERIES
Discharge: HOME | End: 2024-10-15
Payer: COMMERCIAL

## 2024-10-15 PROCEDURE — 97161 PT EVAL LOW COMPLEX 20 MIN: CPT | Performed by: PHYSICAL THERAPIST

## 2024-10-15 ASSESSMENT — PAIN DESCRIPTION - ORIENTATION: ORIENTATION: LOWER;RIGHT;LEFT

## 2024-10-15 ASSESSMENT — PAIN DESCRIPTION - PAIN TYPE: TYPE: CHRONIC PAIN

## 2024-10-15 ASSESSMENT — PAIN DESCRIPTION - LOCATION: LOCATION: BACK;HIP

## 2024-10-15 ASSESSMENT — PAIN SCALES - GENERAL: PAINLEVEL_OUTOF10: 5

## 2024-10-15 NOTE — PROGRESS NOTES
No    Palpation:   Lumbar Spine Palpation: no pain with palpation to LS region    Ambulation/Gait (if applicable):   Ambulation  Surface: Level tile  Device: No Device  Assistance: Independent  Quality of Gait: stable gait mechanics- fwd trunk posturing    Balance Screen:   Balance  Posture: Fair (fwd trunk posturing)  Sitting - Static: Good  Sitting - Dynamic: Good  Standing - Static: Good  Standing - Dynamic: Good    Neuro Screen:   Sensation      Sensation  Overall Sensation Status: WFL     Left AROM  Right AROM       LLE- WFL     RLE- WFL        Left Strength  Right Strength         Strength LLE  L Hip Flexion: 4-/5  L Hip Extension: 4-/5  L Hip ABduction: 4-/5  L Hip ADduction: 4+/5  L Knee Flexion: 4+/5  L Knee Extension: 4+/5  L Ankle Dorsiflexion: 4+/5    Strength RLE  R Hip Flexion: 4-/5  R Hip Extension: 4-/5  R Hip ABduction: 4-/5  R Hip ADduction: 4+/5  R Knee Flexion: 4+/5  R Knee Extension: 4+/5  R Ankle Dorsiflexion: 4+/5     Lumbar Assessment     AROM Lumbar Spine   Lumbar spine general AROM: limtied 25% all planes       Trunk Strength     Trunk Strength  Trunk Flexion: 3+/5  Trunk Extension: 3+/5     Special Tests:   Special Tests Lumbar Spine  Lumbar Special Tests: Performed  SLR: R (-), L (-)    ASSESSMENT     Impression: Assessment: Patient presents to PT with c/o back pain with radicular symptoms into LEs. Limited AROM/strength of trunk/hips. Fwd trunk posturing noted    Body Structures, Functions, Activity Limitations Requiring Skilled Therapeutic Intervention: Decreased ROM, Decreased strength, Decreased posture, Increased pain    Statement of Medical Necessity: Physical Therapy is both indicated and medically necessary as outlined in the POC to increase the likelihood of meeting the functionally related goals stated below.     Patient's Activity Tolerance:        Patient's rehabilitation potential/prognosis is considered to be: Good    Factors which may impact rehabilitation potential

## 2024-10-17 ENCOUNTER — HOSPITAL ENCOUNTER (OUTPATIENT)
Dept: PHYSICAL THERAPY | Age: 84
Setting detail: THERAPIES SERIES
Discharge: HOME | End: 2024-10-17
Payer: COMMERCIAL

## 2024-10-17 PROCEDURE — 97110 THERAPEUTIC EXERCISES: CPT | Performed by: PHYSICAL THERAPIST

## 2024-10-17 NOTE — PROGRESS NOTES
other medical complications  [] Other: performed light there ex to increase AROM/strength of trunk/B hips. Decreased tightness across back region with there ex. MH placed following for symptom relief.     Plan:   [x] Continue per plan of care [] Alter current plan (see comments)  [] Plan of care initiated [] Hold pending MD visit [] Discharge  Plan for Next Session:         Treatment Charges: Mins Units   Initial Evaluation     Re-Evaluation     Ther Exercise         TE 35 2   Manual Therapy     MT     Ther Activities        TA     Gait Training          GT     Neuro Re-education NR     Modalities 10 -   Non-Billable Service Time 5    Other     Total Time/Units 50 2     Electronically signed by:  Jose Trimble PT

## 2024-10-22 ENCOUNTER — HOSPITAL ENCOUNTER (OUTPATIENT)
Dept: PHYSICAL THERAPY | Age: 84
Setting detail: THERAPIES SERIES
Discharge: HOME | End: 2024-10-22
Payer: COMMERCIAL

## 2024-10-22 PROCEDURE — 97110 THERAPEUTIC EXERCISES: CPT | Performed by: PHYSICAL THERAPIST

## 2024-10-22 NOTE — PROGRESS NOTES
[facility]  Phone: [phone] Fax: [phone]       Physical Therapy Daily Treatment Note  Date:  10/22/2024    Patient Name:  Laron Foster    :  1940  MRN: [id number]    Patient: Laron Foster (83 y.o. 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 Admits to only being able to walk a short distances before posturing declines     Exercises:  Exercise/Equipment Resistance/Repetitions Other comments   Bike    [id number]            Trunk rotation    10x10s ea    Piriformis stretch    5x10s ea           Bridge   X10    Pelvic tilts    x10    SLR    x10ea    Sit to stand    X10                                                                                Other Therapeutic Activities:      Home Exercise Program:      Manual Treatments:      Modalities:  MH x 10mins    Timed Code Treatment Minutes:      Total Treatment Minutes:      Treatment/Activity Tolerance:  [x] Patient tolerated treatment well [] Patient limited

## 2024-10-24 ENCOUNTER — HOSPITAL ENCOUNTER (OUTPATIENT)
Dept: PHYSICAL THERAPY | Age: 84
Setting detail: THERAPIES SERIES
Discharge: HOME | End: 2024-10-24
Payer: COMMERCIAL

## 2024-10-24 PROCEDURE — 97110 THERAPEUTIC EXERCISES: CPT | Performed by: PHYSICAL THERAPIST

## 2024-10-24 NOTE — PROGRESS NOTES
[facility]  Phone: [phone] Fax: [phone]       Physical Therapy Daily Treatment Note  Date:  10/24/2024    Patient Name:  Laron Foster    :  1940  MRN: [id number]    Patient: Laron Foster (83 y.o. 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]  Ankylosing hyperostosis (forestier), 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]  Radiculopathy, lumbosacral region [M54.17]  Other intervertebral disc degeneration, 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]  Low back pain, unspecified [M54.50]  Pain in right leg [M79.604]    Treatment Diagnosis:       Time In:  1100     Time Out:    1150      Subjective:  pt reports having some mild pain across buttocks region     Exercises:  Exercise/Equipment Resistance/Repetitions Other comments   Bike    [id number]            Trunk rotation    10x10s ea    Piriformis stretch    5x10s ea    Figure 4 stretch   5x10s ea     Bridge   X10    Pelvic tilts    x10    SLR    x10ea    Sit to stand    X10              Leg press     X10 5plates                                                               Other Therapeutic Activities:      Home Exercise Program:      Manual Treatments:      Modalities:  MH x 10mins    Timed Code Treatment Minutes:      Total Treatment Minutes:      Treatment/Activity Tolerance:  [x] Patient tolerated treatment well [] Patient limited by fatigue  []

## 2024-10-29 ENCOUNTER — HOSPITAL ENCOUNTER (OUTPATIENT)
Dept: PHYSICAL THERAPY | Age: 84
Setting detail: THERAPIES SERIES
Discharge: HOME | End: 2024-10-29
Payer: COMMERCIAL

## 2024-10-29 PROCEDURE — 97110 THERAPEUTIC EXERCISES: CPT | Performed by: PHYSICAL THERAPIST

## 2024-10-29 NOTE — PROGRESS NOTES
[facility]  Phone: [phone] Fax: [phone]       Physical Therapy Daily Treatment Note  Date:  10/29/2024    Patient Name:  Laron Foster    :  1940  MRN: [id number]    Patient: Laron Foster (83 y.o. 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]  Presence of other specified functional implants [Z96.89]  Ankylosing hyperostosis (forestier), 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]  Radiculopathy, lumbosacral region [M54.17]  Other intervertebral disc degeneration, 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]  Low back pain, unspecified [M54.50]  Pain in right leg [M79.604]    Treatment Diagnosis:       Time In:  1100     Time Out:    1150      Subjective:  pt reports that he believes he has noticed some improvement     Exercises:  Exercise/Equipment Resistance/Repetitions Other comments   Bike    [id number]            Trunk rotation    10x10s ea    Piriformis stretch    5x10s ea    Figure 4 stretch   5x10s ea     Bridge   X10    Pelvic tilts    x10    SLR    x10ea    Sit to stand    X10              Leg press     2X10 5plates                                                               Other Therapeutic Activities:      Home Exercise Program:      Manual Treatments:      Modalities:  MH x 10mins    Timed Code Treatment Minutes:      Total Treatment Minutes:      Treatment/Activity Tolerance:  [x] Patient tolerated treatment well [] Patient limited by fatigue  []

## 2024-10-31 ENCOUNTER — HOSPITAL ENCOUNTER (OUTPATIENT)
Dept: PHYSICAL THERAPY | Age: 84
Setting detail: THERAPIES SERIES
Discharge: HOME | End: 2024-10-31
Payer: COMMERCIAL

## 2024-10-31 PROCEDURE — 97110 THERAPEUTIC EXERCISES: CPT | Performed by: PHYSICAL THERAPIST

## 2024-10-31 NOTE — PROGRESS NOTES
[facility]  Phone: [phone] Fax: [phone]       Physical Therapy Daily Treatment Note  Date:  10/31/2024    Patient Name:  Laron Foster    :  1940  MRN: [id number]    Patient: Laron Foster (83 y.o. 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 States that was his current limit     Exercises:  Exercise/Equipment Resistance/Repetitions Other comments   Bike    [id number]            Trunk rotation    10x10s ea    Piriformis stretch    5x10s ea    Figure 4 stretch   5x10s ea     Bridge   X10    Pelvic tilts    x10    SLR    x10ea    Sit to stand    X10              Leg press     2X10 5plates                                                               Other Therapeutic Activities:      Home Exercise Program:      Manual Treatments:      Modalities:  MH x 10mins    Timed Code Treatment Minutes:      Total Treatment Minutes:      Treatment/Activity Tolerance:  [x] Patient

## 2024-11-05 ENCOUNTER — HOSPITAL ENCOUNTER (OUTPATIENT)
Dept: PHYSICAL THERAPY | Age: 84
Setting detail: THERAPIES SERIES
Discharge: HOME | End: 2024-11-05
Payer: COMMERCIAL

## 2024-11-05 PROCEDURE — 97110 THERAPEUTIC EXERCISES: CPT | Performed by: PHYSICAL THERAPIST

## 2024-11-05 NOTE — PROGRESS NOTES
[facility]  Phone: [phone] Fax: [phone]       Physical Therapy Daily Treatment Note  Date:  2024    Patient Name:  Laron Foster    :  1940  MRN: [id number]    Patient: Laron Foster (83 y.o. 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    Exercises:  Exercise/Equipment Resistance/Repetitions Other comments   Bike    [id number]            Trunk rotation    10x10s ea    Piriformis stretch    5x10s ea    Figure 4 stretch   5x10s ea     Bridge   X10    Pelvic tilts    x10    SLR    x10ea    Sit to stand    X10                                                                           Other Therapeutic Activities:      Home Exercise Program:      Manual Treatments:      Modalities:  MH x 10mins    Timed Code Treatment Minutes:      Total Treatment Minutes:      Treatment/Activity Tolerance:  [x] Patient tolerated treatment well [] Patient limited by fatigue  [] Patient limited by pain  [] Patient limited by other

## 2024-11-07 ENCOUNTER — HOSPITAL ENCOUNTER (OUTPATIENT)
Dept: PHYSICAL THERAPY | Age: 84
Setting detail: THERAPIES SERIES
Discharge: HOME | End: 2024-11-07
Payer: COMMERCIAL

## 2024-11-07 PROCEDURE — 97110 THERAPEUTIC EXERCISES: CPT | Performed by: PHYSICAL THERAPIST

## 2024-11-07 NOTE — PROGRESS NOTES
[facility]  Phone: [phone] Fax: [phone]       Physical Therapy Daily Treatment Note  Date:  2024    Patient Name:  Laron Foster    :  1940  MRN: [id number]    Patient: Laron Foster (83 y.o. 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     Exercises:  Exercise/Equipment Resistance/Repetitions Other comments   Bike    [id number]            Trunk rotation    10x10s ea    Piriformis stretch    5x10s ea    Figure 4 stretch   5x10s ea     Bridge   X10    Pelvic tilts    x10    SLR    x10ea    Sit to stand    X10                                                                           Other Therapeutic Activities:      Home Exercise Program:      Manual Treatments:      Modalities:  MH x 10mins    Timed Code Treatment Minutes:      Total Treatment Minutes:      Treatment/Activity Tolerance:  [x] Patient tolerated treatment well [] Patient limited by fatigue  [] Patient limited by pain  [] Patient limited by other

## 2024-11-12 ENCOUNTER — HOSPITAL ENCOUNTER (OUTPATIENT)
Dept: PHYSICAL THERAPY | Age: 84
Setting detail: THERAPIES SERIES
Discharge: HOME | End: 2024-11-12
Payer: COMMERCIAL

## 2024-11-12 PROCEDURE — 97110 THERAPEUTIC EXERCISES: CPT | Performed by: PHYSICAL THERAPIST

## 2024-11-12 NOTE — PROGRESS NOTES
[facility]  Phone: [phone] Fax: [phone]       Physical Therapy Daily Treatment Note  Date:  2024    Patient Name:  Laron Foster    :  1940  MRN: [id number]    Patient: Laron Foster (83 y.o. 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 Mild soreness today.      Exercises:  Exercise/Equipment Resistance/Repetitions Other comments   Bike    [id number]            Trunk rotation    10x10s ea    Piriformis stretch    5x10s ea    Figure 4 stretch   5x10s ea     Bridge   X10    Pelvic tilts    x10    SLR    x10ea    Sit to stand    X10              Leg press     2X10 5plates                                                               Other Therapeutic Activities:      Home Exercise Program:      Manual Treatments:      Modalities:  MH x 10mins    Timed Code Treatment Minutes:      Total Treatment Minutes:      Treatment/Activity Tolerance:  [x] Patient tolerated treatment well [] Patient limited by fatigue  []

## 2024-11-14 ENCOUNTER — HOSPITAL ENCOUNTER (OUTPATIENT)
Dept: PHYSICAL THERAPY | Age: 84
Setting detail: THERAPIES SERIES
Discharge: HOME | End: 2024-11-14
Payer: COMMERCIAL

## 2024-11-14 PROCEDURE — 97110 THERAPEUTIC EXERCISES: CPT | Performed by: PHYSICAL THERAPIST

## 2024-11-14 NOTE — PROGRESS NOTES
[facility]  Phone: [phone] Fax: [phone]       Physical Therapy Daily Treatment Note  Date:  2024    Patient Name:  Laron Foster    :  1940  MRN: [id number]    Patient: Laron Foster (83 y.o. 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]  Presence of other specified functional implants [Z96.89]  Ankylosing hyperostosis (forestier), 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]  Radiculopathy, lumbosacral region [M54.17]  Other intervertebral disc degeneration, 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]  Low back pain, unspecified [M54.50]  Pain in right leg [M79.604]    Treatment Diagnosis:       Time In:  1100   Time Out:    1155     Subjective:  pt reports he has noticed improvement with greater trunk/LE strength    Exercises:  Exercise/Equipment Resistance/Repetitions Other comments   Bike    [id number]            Trunk rotation    10x10s ea    Piriformis stretch    5x10s ea    Figure 4 stretch   5x10s ea     Bridge   X10    Pelvic tilts    x10    SLR    x10ea    Sit to stand    X10              Leg press     2X10 5plates                                                               Other Therapeutic Activities:      Home Exercise Program:      Manual Treatments:      Modalities:  MH x 10mins    Timed Code Treatment Minutes:      Total Treatment Minutes:      Treatment/Activity Tolerance:  [x] Patient tolerated treatment well [] Patient limited by

## 2024-11-19 ENCOUNTER — HOSPITAL ENCOUNTER (OUTPATIENT)
Dept: PHYSICAL THERAPY | Age: 84
Setting detail: THERAPIES SERIES
Discharge: HOME | End: 2024-11-19
Payer: COMMERCIAL

## 2024-11-19 PROCEDURE — 97110 THERAPEUTIC EXERCISES: CPT | Performed by: PHYSICAL THERAPIST

## 2024-11-19 NOTE — PROGRESS NOTES
[facility]  Phone: [phone] Fax: [phone]       Physical Therapy Daily Treatment Note  Date:  2024    Patient Name:  Laron Foster    :  1940  MRN: [id number]    Patient: Laron Foster (83 y.o. 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]  Presence of other specified functional implants [Z96.89]  Ankylosing hyperostosis (forestier), 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]  Radiculopathy, lumbosacral region [M54.17]  Other intervertebral disc degeneration, 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]  Low back pain, unspecified [M54.50]  Pain in right leg [M79.604]    Treatment Diagnosis:       Time In:  1100   Time Out:    1200  Subjective:  pt reports no issues over the weekend     Exercises:  Exercise/Equipment Resistance/Repetitions Other comments   Bike    [id number]            Trunk rotation    10x10s ea    Piriformis stretch    5x10s ea    Figure 4 stretch   5x10s ea     Bridge   X10    Pelvic tilts    x10    SLR    x10ea    Sit to stand    X10              Leg press     2X10 5plates                                                               Other Therapeutic Activities:      Home Exercise Program:      Manual Treatments:      Modalities:  MH x 10mins    Timed Code Treatment Minutes:      Total Treatment Minutes:      Treatment/Activity Tolerance:  [x] Patient tolerated treatment well [] Patient limited by fatigue  [] Patient limited by pain  []

## 2024-11-21 ENCOUNTER — HOSPITAL ENCOUNTER (OUTPATIENT)
Dept: PHYSICAL THERAPY | Age: 84
Setting detail: THERAPIES SERIES
Discharge: HOME | End: 2024-11-21
Payer: COMMERCIAL

## 2024-11-21 PROCEDURE — 97110 THERAPEUTIC EXERCISES: CPT | Performed by: PHYSICAL THERAPIST

## 2024-11-21 NOTE — PROGRESS NOTES
[facility]  Phone: [phone] Fax: [phone]       Physical Therapy Daily Treatment Note  Date:  2024    Patient Name:  Laron Foster    :  1940  MRN: [id number]    Patient: Laron Foster (83 y.o. 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 Tolerated it well     Exercises:  Exercise/Equipment Resistance/Repetitions Other comments   Bike    [id number]            Trunk rotation    10x10s ea    Piriformis stretch    5x10s ea    Figure 4 stretch   5x10s ea     Bridge   X10    Pelvic tilts    x10    SLR    x10ea    Sit to stand    X10              Leg press     2X10 5plates                                                               Other Therapeutic Activities:      Home Exercise Program:      Manual Treatments:      Modalities:  MH x 10mins    Timed Code Treatment Minutes:      Total Treatment Minutes:      Treatment/Activity Tolerance:  [x] Patient tolerated treatment well [] Patient limited by

## 2024-11-26 ENCOUNTER — HOSPITAL ENCOUNTER (OUTPATIENT)
Dept: PHYSICAL THERAPY | Age: 84
Setting detail: THERAPIES SERIES
Discharge: HOME | End: 2024-11-26
Payer: COMMERCIAL

## 2024-11-26 PROCEDURE — 97110 THERAPEUTIC EXERCISES: CPT | Performed by: PHYSICAL THERAPIST

## 2024-11-26 NOTE — PROGRESS NOTES
[facility]  Phone: [phone] Fax: [phone]       Physical Therapy Daily Treatment Note  Date:  2024    Patient Name:  Laron Foster    :  1940  MRN: [id number]    Patient: Laron Foster (83 y.o. 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]  Presence of other specified functional implants [Z96.89]  Ankylosing hyperostosis (forestier), 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]  Radiculopathy, lumbosacral region [M54.17]  Other intervertebral disc degeneration, 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]  Low back pain, unspecified [M54.50]  Pain in right leg [M79.604]    Treatment Diagnosis:       Time In:  1100   Time Out:    1200  Subjective:  pt reports symptoms have improved     Exercises:  Exercise/Equipment Resistance/Repetitions Other comments   Bike    [id number]            Trunk rotation    10x10s ea    Piriformis stretch    5x10s ea    Figure 4 stretch   5x10s ea     Bridge   X10    Pelvic tilts    x10    SLR    x10ea    Sit to stand    X10              Leg press     2X10 5plates                                                               Other Therapeutic Activities:      Home Exercise Program:      Manual Treatments:      Modalities:  MH x 10mins    Timed Code Treatment Minutes:      Total Treatment Minutes:      Treatment/Activity Tolerance:  [x] Patient tolerated treatment well [] Patient limited by fatigue  [] Patient limited by pain  []

## 2024-12-03 ENCOUNTER — HOSPITAL ENCOUNTER (OUTPATIENT)
Dept: PHYSICAL THERAPY | Age: 84
Setting detail: THERAPIES SERIES
Discharge: HOME | End: 2024-12-03
Payer: COMMERCIAL

## 2024-12-03 PROCEDURE — 97110 THERAPEUTIC EXERCISES: CPT | Performed by: PHYSICAL THERAPIST

## 2024-12-03 NOTE — PROGRESS NOTES
[facility]  Phone: [phone] Fax: [phone]       Physical Therapy Daily Treatment Note  Date:  12/3/2024    Patient Name:  Laron Foster    :  1940  MRN: [id number]    Patient: Laron Foster (83 y.o. 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 Some soreness from hunting over weekend     Exercises:  Exercise/Equipment Resistance/Repetitions Other comments   Bike    [id number]            Trunk rotation    10x10s ea    Piriformis stretch    5x10s ea    Figure 4 stretch   5x10s ea     Bridge   X10    Pelvic tilts    x10    SLR    x10ea    Sit to stand    X10              Leg press     2X10 5plates                                                               Other Therapeutic Activities:      Home Exercise Program:      Manual Treatments:      Modalities:  MH x 10mins    Timed Code Treatment Minutes:      Total Treatment Minutes:      Treatment/Activity Tolerance:  [x] Patient tolerated treatment well [] Patient limited by

## 2024-12-10 ENCOUNTER — HOSPITAL ENCOUNTER (OUTPATIENT)
Dept: PHYSICAL THERAPY | Age: 84
Setting detail: THERAPIES SERIES
Discharge: HOME | End: 2024-12-10
Payer: COMMERCIAL

## 2024-12-10 PROCEDURE — 97110 THERAPEUTIC EXERCISES: CPT | Performed by: PHYSICAL THERAPIST

## 2024-12-10 NOTE — PROGRESS NOTES
[facility]  Phone: [phone] Fax: [phone]       Physical Therapy Daily Treatment Note  Date:  12/10/2024    Patient Name:  Laron Foster    :  1940  MRN: [id number]    Patient: Laron Foster (83 y.o. 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     Exercises:  Exercise/Equipment Resistance/Repetitions Other comments   Bike    [id number]            Trunk rotation    10x10s ea    Piriformis stretch    5x10s ea    Figure 4 stretch   5x10s ea     Bridge   X10    Pelvic tilts    x10    SLR    x10ea    Sit to stand    X10              Leg press     2X10 5plates                                                               Other Therapeutic Activities:      Home Exercise Program:      Manual Treatments:      Modalities:  MH x 10mins    Timed Code Treatment Minutes:      Total Treatment Minutes:      Treatment/Activity Tolerance:  [x] Patient tolerated treatment well [] Patient limited by fatigue  [] Patient limited by

## 2025-02-18 PROCEDURE — 88305 TISSUE EXAM BY PATHOLOGIST: CPT | Performed by: DERMATOLOGY

## 2025-02-18 PROCEDURE — 88312 SPECIAL STAINS GROUP 1: CPT | Performed by: DERMATOLOGY

## 2025-02-19 ENCOUNTER — LAB REQUISITION (OUTPATIENT)
Dept: DERMATOPATHOLOGY | Facility: CLINIC | Age: 85
End: 2025-02-19
Payer: MEDICARE

## 2025-02-19 DIAGNOSIS — D48.5 NEOPLASM OF UNCERTAIN BEHAVIOR OF SKIN: ICD-10-CM

## 2025-02-24 LAB
LABORATORY COMMENT REPORT: NORMAL
PATH REPORT.FINAL DX SPEC: NORMAL
PATH REPORT.GROSS SPEC: NORMAL
PATH REPORT.RELEVANT HX SPEC: NORMAL
PATH REPORT.TOTAL CANCER: NORMAL